# Patient Record
Sex: FEMALE | Race: ASIAN | NOT HISPANIC OR LATINO | Employment: STUDENT | URBAN - METROPOLITAN AREA
[De-identification: names, ages, dates, MRNs, and addresses within clinical notes are randomized per-mention and may not be internally consistent; named-entity substitution may affect disease eponyms.]

---
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
## 2024-01-23 DIAGNOSIS — N92.6 IRREGULAR PERIODS: ICD-10-CM

## 2024-01-24 RX ORDER — NORETHINDRONE ACETATE AND ETHINYL ESTRADIOL 5-7-9-7
1 KIT ORAL DAILY
Qty: 84 TABLET | Refills: 0 | Status: SHIPPED | OUTPATIENT
Start: 2024-01-24 | End: 2024-04-25 | Stop reason: ALTCHOICE

## 2024-01-24 NOTE — TELEPHONE ENCOUNTER
Pt is overdue for an appt.     My chart message sent to the pt.     I did send in 1 refill.    Yahaira BYERS RN

## 2024-02-10 ENCOUNTER — HEALTH MAINTENANCE LETTER (OUTPATIENT)
Age: 19
End: 2024-02-10

## 2024-04-13 DIAGNOSIS — E11.9 TYPE 2 DIABETES MELLITUS WITHOUT COMPLICATION, WITHOUT LONG-TERM CURRENT USE OF INSULIN (H): ICD-10-CM

## 2024-04-15 RX ORDER — METFORMIN HCL 500 MG
TABLET, EXTENDED RELEASE 24 HR ORAL
Qty: 90 TABLET | Refills: 0 | Status: SHIPPED | OUTPATIENT
Start: 2024-04-15 | End: 2024-04-25

## 2024-04-25 ENCOUNTER — OFFICE VISIT (OUTPATIENT)
Dept: FAMILY MEDICINE | Facility: CLINIC | Age: 19
End: 2024-04-25
Attending: PHYSICIAN ASSISTANT
Payer: COMMERCIAL

## 2024-04-25 VITALS
DIASTOLIC BLOOD PRESSURE: 83 MMHG | RESPIRATION RATE: 14 BRPM | BODY MASS INDEX: 34.37 KG/M2 | TEMPERATURE: 98.4 F | HEART RATE: 82 BPM | OXYGEN SATURATION: 98 % | HEIGHT: 67 IN | WEIGHT: 219 LBS | SYSTOLIC BLOOD PRESSURE: 125 MMHG

## 2024-04-25 DIAGNOSIS — R74.8 ELEVATED LIVER ENZYMES: ICD-10-CM

## 2024-04-25 DIAGNOSIS — K76.0 FATTY LIVER DISEASE, NONALCOHOLIC: ICD-10-CM

## 2024-04-25 DIAGNOSIS — F33.1 MAJOR DEPRESSIVE DISORDER, RECURRENT, MODERATE (H): ICD-10-CM

## 2024-04-25 DIAGNOSIS — E28.2 PCOS (POLYCYSTIC OVARIAN SYNDROME): Primary | ICD-10-CM

## 2024-04-25 DIAGNOSIS — N91.2 AMENORRHEA: ICD-10-CM

## 2024-04-25 DIAGNOSIS — E11.9 TYPE 2 DIABETES MELLITUS WITHOUT COMPLICATION, WITHOUT LONG-TERM CURRENT USE OF INSULIN (H): ICD-10-CM

## 2024-04-25 LAB
ERYTHROCYTE [DISTWIDTH] IN BLOOD BY AUTOMATED COUNT: 12.1 % (ref 10–15)
HBA1C MFR BLD: 6 % (ref 0–5.6)
HCT VFR BLD AUTO: 39.6 % (ref 35–47)
HGB BLD-MCNC: 13.4 G/DL (ref 11.7–15.7)
HOLD SPECIMEN: NORMAL
HOLD SPECIMEN: NORMAL
MCH RBC QN AUTO: 30 PG (ref 26.5–33)
MCHC RBC AUTO-ENTMCNC: 33.8 G/DL (ref 31.5–36.5)
MCV RBC AUTO: 89 FL (ref 78–100)
PLATELET # BLD AUTO: 243 10E3/UL (ref 150–450)
RBC # BLD AUTO: 4.47 10E6/UL (ref 3.8–5.2)
WBC # BLD AUTO: 10.1 10E3/UL (ref 4–11)

## 2024-04-25 PROCEDURE — 80053 COMPREHEN METABOLIC PANEL: CPT | Performed by: FAMILY MEDICINE

## 2024-04-25 PROCEDURE — 36415 COLL VENOUS BLD VENIPUNCTURE: CPT | Performed by: FAMILY MEDICINE

## 2024-04-25 PROCEDURE — 85027 COMPLETE CBC AUTOMATED: CPT | Performed by: FAMILY MEDICINE

## 2024-04-25 PROCEDURE — 80061 LIPID PANEL: CPT | Performed by: FAMILY MEDICINE

## 2024-04-25 PROCEDURE — 83036 HEMOGLOBIN GLYCOSYLATED A1C: CPT | Performed by: FAMILY MEDICINE

## 2024-04-25 PROCEDURE — 84443 ASSAY THYROID STIM HORMONE: CPT | Performed by: FAMILY MEDICINE

## 2024-04-25 PROCEDURE — 99214 OFFICE O/P EST MOD 30 MIN: CPT | Performed by: FAMILY MEDICINE

## 2024-04-25 RX ORDER — METFORMIN HCL 500 MG
1000 TABLET, EXTENDED RELEASE 24 HR ORAL
Qty: 180 TABLET | Refills: 1 | Status: SHIPPED | OUTPATIENT
Start: 2024-04-25 | End: 2024-05-12

## 2024-04-25 RX ORDER — DESOGESTREL AND ETHINYL ESTRADIOL 0.15-0.03
1 KIT ORAL DAILY
Qty: 84 TABLET | Refills: 3 | Status: SHIPPED | OUTPATIENT
Start: 2024-04-25

## 2024-04-25 ASSESSMENT — PAIN SCALES - GENERAL: PAINLEVEL: NO PAIN (0)

## 2024-04-25 NOTE — PROGRESS NOTES
"  Assessment & Plan     PCOS (polycystic ovarian syndrome)  Suspect that PCOS is the cause of her amenorrhea.  Will change her from a triphasic OCP to a monophasic OCP and monitor response.    Amenorrhea  Patient says she is not sexually active.  Will check labs, and change OCP.  Follow-up in 3 months or sooner as needed.  - TSH with free T4 reflex; Future  - TSH with free T4 reflex  - desogestrel-ethinyl estradiol (APRI) 0.15-30 MG-MCG tablet; Take 1 tablet by mouth daily  - CBC with platelets; Future  - CBC with platelets    Elevated liver enzymes  Recheck labs, recommendations pending results.    Fatty liver disease, nonalcoholic  Due for labs, recommendations pending results.  - Lipid panel reflex to direct LDL Fasting; Future  - Lipid panel reflex to direct LDL Fasting    Major depressive disorder, recurrent, moderate (H)  Stable, continue to monitor.    Type 2 diabetes mellitus without complication, without long-term current use of insulin (H)  Due for labs, continue metformin, recommendations pending results.  - Hemoglobin A1c; Future  - Hemoglobin A1c  - metFORMIN (GLUCOPHAGE XR) 500 MG 24 hr tablet; Take 2 tablets (1,000 mg) by mouth daily (with dinner)  - Comprehensive metabolic panel (BMP + Alb, Alk Phos, ALT, AST, Total. Bili, TP); Future  - CBC with platelets; Future  - Comprehensive metabolic panel (BMP + Alb, Alk Phos, ALT, AST, Total. Bili, TP)  - CBC with platelets      15 minutes spent by me on the date of the encounter doing chart review, history and exam, documentation and further activities per the note      BMI  Estimated body mass index is 34.3 kg/m  as calculated from the following:    Height as of this encounter: 1.702 m (5' 7\").    Weight as of this encounter: 99.3 kg (219 lb).   Weight management plan: Discussed healthy diet and exercise guidelines      See Patient Instructions    Jessica Xiong is a 19 year old, presenting for the following health issues:  Diabetes (Follow up)        " 4/25/2024     3:52 PM   Additional Questions   Roomed by Lida Sosa   Accompanied by mother     History of Present Illness       Diabetes:   She presents for follow up of diabetes.    She is not checking blood glucose.        She is concerned about other.    She is not experiencing numbness or burning in feet, excessive thirst, blurry vision, weight changes or redness, sores or blisters on feet. The patient has not had a diabetic eye exam in the last 12 months.          Would like to discuss birth control medication    Medication Followup of  TILIA FE 1-20/1-30/1-35 MG  Taking Medication as prescribed: yes  Side Effects:  None  Medication Helping Symptoms: No    Has not had her period since January. Taking the placebo pills.  It did work for a while, she took a trip and changed what time of day she took the pills. Not sure if this is the cause, no changes.    Medication Followup of Metformin   Taking Medication as prescribed: yes  Side Effects:  None  Medication Helping Symptoms:  yes      Current Outpatient Medications   Medication Sig Dispense Refill    blood glucose (NO BRAND SPECIFIED) test strip Use to test blood sugar 1 times daily or as directed. To accompany: Blood Glucose Monitor Brands: per insurance. 100 strip 6    blood glucose monitoring (NO BRAND SPECIFIED) meter device kit Use to test blood sugar 1 times daily or as directed. Preferred blood glucose meter OR supplies to accompany: Blood Glucose Monitor Brands: per insurance. 1 kit 0    desogestrel-ethinyl estradiol (APRI) 0.15-30 MG-MCG tablet Take 1 tablet by mouth daily 84 tablet 3    loratadine (CLARITIN) 10 MG tablet Take 1 tablet (10 mg) by mouth daily 30 tablet 1    metFORMIN (GLUCOPHAGE XR) 500 MG 24 hr tablet Take 2 tablets (1,000 mg) by mouth daily (with dinner) 180 tablet 1    thin (NO BRAND SPECIFIED) lancets Use with lanceting device. To accompany: Blood Glucose Monitor Brands: per insurance. 100 each 6    vitamin D3 (CHOLECALCIFEROL)  "50 mcg (2000 units) tablet Take 1 tablet by mouth daily             Objective    /83 (BP Location: Right arm, Patient Position: Sitting, Cuff Size: Adult Regular)   Pulse 82   Temp 98.4  F (36.9  C) (Oral)   Resp 14   Ht 1.702 m (5' 7\")   Wt 99.3 kg (219 lb)   SpO2 98%   Breastfeeding No   BMI 34.30 kg/m    Body mass index is 34.3 kg/m .  Physical Exam   GENERAL: alert and no distress  RESP: lungs clear to auscultation - no rales, rhonchi or wheezes  CV: regular rates and rhythm  PSYCH: mentation appears normal, affect normal/bright    Labs reviewed in chart        Signed Electronically by: Zoey Brewster MD    "

## 2024-04-26 LAB
ALBUMIN SERPL BCG-MCNC: 4.6 G/DL (ref 3.5–5.2)
ALP SERPL-CCNC: 45 U/L (ref 40–150)
ALT SERPL W P-5'-P-CCNC: 169 U/L (ref 0–50)
ANION GAP SERPL CALCULATED.3IONS-SCNC: 14 MMOL/L (ref 7–15)
AST SERPL W P-5'-P-CCNC: 145 U/L (ref 0–45)
BILIRUB SERPL-MCNC: 0.9 MG/DL
BUN SERPL-MCNC: 9.4 MG/DL (ref 6–20)
CALCIUM SERPL-MCNC: 10 MG/DL (ref 8.6–10)
CHLORIDE SERPL-SCNC: 102 MMOL/L (ref 98–107)
CHOLEST SERPL-MCNC: 163 MG/DL
CREAT SERPL-MCNC: 0.58 MG/DL (ref 0.51–0.95)
DEPRECATED HCO3 PLAS-SCNC: 24 MMOL/L (ref 22–29)
EGFRCR SERPLBLD CKD-EPI 2021: >90 ML/MIN/1.73M2
FASTING STATUS PATIENT QL REPORTED: ABNORMAL
GLUCOSE SERPL-MCNC: 81 MG/DL (ref 70–99)
HDLC SERPL-MCNC: 48 MG/DL
LDLC SERPL CALC-MCNC: 86 MG/DL
NONHDLC SERPL-MCNC: 115 MG/DL
POTASSIUM SERPL-SCNC: 3.9 MMOL/L (ref 3.4–5.3)
PROT SERPL-MCNC: 7.9 G/DL (ref 6.4–8.3)
SODIUM SERPL-SCNC: 140 MMOL/L (ref 135–145)
TRIGL SERPL-MCNC: 143 MG/DL
TSH SERPL DL<=0.005 MIU/L-ACNC: 1.17 UIU/ML (ref 0.3–4.2)

## 2024-05-12 ENCOUNTER — MYC REFILL (OUTPATIENT)
Dept: FAMILY MEDICINE | Facility: CLINIC | Age: 19
End: 2024-05-12
Payer: COMMERCIAL

## 2024-05-12 DIAGNOSIS — K76.0 FATTY LIVER DISEASE, NONALCOHOLIC: ICD-10-CM

## 2024-05-12 DIAGNOSIS — R74.8 ELEVATED LIVER ENZYMES: Primary | ICD-10-CM

## 2024-05-12 DIAGNOSIS — E11.9 TYPE 2 DIABETES MELLITUS WITHOUT COMPLICATION, WITHOUT LONG-TERM CURRENT USE OF INSULIN (H): ICD-10-CM

## 2024-05-13 RX ORDER — METFORMIN HCL 500 MG
1000 TABLET, EXTENDED RELEASE 24 HR ORAL 2 TIMES DAILY WITH MEALS
Qty: 360 TABLET | Refills: 1 | Status: SHIPPED | OUTPATIENT
Start: 2024-05-13

## 2024-06-24 ENCOUNTER — PATIENT OUTREACH (OUTPATIENT)
Dept: CARE COORDINATION | Facility: CLINIC | Age: 19
End: 2024-06-24
Payer: COMMERCIAL

## 2024-06-26 ENCOUNTER — TELEPHONE (OUTPATIENT)
Dept: FAMILY MEDICINE | Facility: CLINIC | Age: 19
End: 2024-06-26
Payer: COMMERCIAL

## 2024-06-26 NOTE — TELEPHONE ENCOUNTER
Patient Quality Outreach    Patient is due for the following:   Diabetes -  Eye Exam  Depression  -  PHQ-9 needed and DAP  Physical Well Child Check      Topic Date Due    COVID-19 Vaccine (4 - 2023-24 season) 09/01/2023    Pneumococcal Vaccine (2 of 2 - PCV) 12/27/2023       Next Steps:   Schedule a Well Child Check    Type of outreach:    Sent [employer] message.    Next Steps:  Reach out within 90 days via Phone.    Max number of attempts reached: No. Will try again in 90 days if patient still on fail list.    Questions for provider review:    None           Francheska Munroe MA

## 2024-09-07 ENCOUNTER — HEALTH MAINTENANCE LETTER (OUTPATIENT)
Age: 19
End: 2024-09-07

## 2024-10-27 ENCOUNTER — MYC MEDICAL ADVICE (OUTPATIENT)
Dept: FAMILY MEDICINE | Facility: CLINIC | Age: 19
End: 2024-10-27
Payer: COMMERCIAL

## 2024-10-28 NOTE — TELEPHONE ENCOUNTER
See [employer] message.     Replied via [employer].     Jazz ARIAS RN   Clinic RN  [facility]

## 2024-10-31 ENCOUNTER — OFFICE VISIT (OUTPATIENT)
Dept: OPTOMETRY | Facility: CLINIC | Age: 19
End: 2024-10-31
Payer: COMMERCIAL

## 2024-10-31 DIAGNOSIS — H52.13 MYOPIA OF BOTH EYES: ICD-10-CM

## 2024-10-31 DIAGNOSIS — E11.9 TYPE 2 DIABETES MELLITUS WITHOUT RETINOPATHY (H): Primary | ICD-10-CM

## 2024-10-31 PROCEDURE — 92004 COMPRE OPH EXAM NEW PT 1/>: CPT | Performed by: OPTOMETRIST

## 2024-10-31 PROCEDURE — 92015 DETERMINE REFRACTIVE STATE: CPT | Performed by: OPTOMETRIST

## 2024-10-31 ASSESSMENT — CONF VISUAL FIELD
OD_INFERIOR_TEMPORAL_RESTRICTION: 0
METHOD: COUNTING FINGERS
OS_INFERIOR_NASAL_RESTRICTION: 0
OS_SUPERIOR_NASAL_RESTRICTION: 0
OD_INFERIOR_NASAL_RESTRICTION: 0
OS_NORMAL: 1
OD_SUPERIOR_NASAL_RESTRICTION: 0
OS_SUPERIOR_TEMPORAL_RESTRICTION: 0
OS_INFERIOR_TEMPORAL_RESTRICTION: 0
OD_NORMAL: 1
OD_SUPERIOR_TEMPORAL_RESTRICTION: 0

## 2024-10-31 ASSESSMENT — REFRACTION_WEARINGRX
OD_SPHERE: -1.50
OD_CYLINDER: SPHERE
SPECS_TYPE: SVL
OS_AXIS: 151
OS_SPHERE: -1.25
OS_CYLINDER: +0.25

## 2024-10-31 ASSESSMENT — REFRACTION_MANIFEST
OS_CYLINDER: +0.25
OS_AXIS: 153
OD_SPHERE: -2.00
OD_CYLINDER: +0.25
OS_CYLINDER: SPHERE
OD_CYLINDER: SPHERE
OS_SPHERE: -1.75
METHOD_AUTOREFRACTION: 1
OD_AXIS: 020
OS_SPHERE: -2.50
OD_SPHERE: -2.25

## 2024-10-31 ASSESSMENT — VISUAL ACUITY
OD_CC: 20/20
METHOD: SNELLEN - LINEAR
OS_CC: 20/25
OD_CC: 20/20
CORRECTION_TYPE: GLASSES
OS_CC: 20/20-2

## 2024-10-31 ASSESSMENT — TONOMETRY
OD_IOP_MMHG: 17
IOP_METHOD: APPLANATION
OS_IOP_MMHG: 17

## 2024-10-31 ASSESSMENT — SLIT LAMP EXAM - LIDS
COMMENTS: NORMAL
COMMENTS: NORMAL

## 2024-10-31 ASSESSMENT — EXTERNAL EXAM - LEFT EYE: OS_EXAM: NORMAL

## 2024-10-31 ASSESSMENT — CUP TO DISC RATIO
OD_RATIO: 0.4
OS_RATIO: 0.3

## 2024-10-31 ASSESSMENT — EXTERNAL EXAM - RIGHT EYE: OD_EXAM: NORMAL

## 2024-10-31 NOTE — PATIENT INSTRUCTIONS
Patient Education   Diabetes weakens the blood vessels all over the body, including the eyes. Damage to the blood vessels in the eyes can cause swelling or bleeding into part of the eye (called the retina). This is called diabetic retinopathy (TAMARA-tin-AH-pu-thee). If not treated, this disease can cause vision loss or blindness.   Symptoms may include blurred or distorted vision, but many people have no symptoms. It's important to see your eye doctor regularly to check for problems.   Early treatment and good control can help protect your vision. Here are the things you can do to help prevent vision loss:      1. Keep your blood sugar levels under tight control.      2. Bring high blood pressure under control.      3. No smoking.      4. Have yearly dilated eye exams.    No retinopathy!   Very mild change in prescription    present

## 2024-10-31 NOTE — LETTER
10/31/2024      Inga Covarrubias  [address] [facility] [address]      Dear Colleague,    Thank you for referring your patient, Inga Covarrubias, to the [facility]. Please see a copy of my visit note below.    Chief Complaint   Patient presents with     Diabetic Eye Exam       Lab Results   Component Value Date    A1C 6.0 04/25/2024    A1C 5.7 10/20/2023    A1C 5.7 04/04/2023    A1C 5.9 12/27/2022    A1C 6.7 08/02/2022            Last Eye Exam: 4-5 years ago   Dilated Previously: Yes, side effects of dilation explained today    What are you currently using to see?  glasses    Distance Vision Acuity: Noticed gradual change in both eyes in glasses     Near Vision Acuity: Satisfied with vision while reading and using computer with glasses    Eye Comfort: good  Do you use eye drops? : No      Kate Dick - Optometric Assistant      Medical, surgical and family histories reviewed and updated 10/31/2024.       OBJECTIVE: See Ophthalmology exam    ASSESSMENT:    ICD-10-CM    1. Type 2 diabetes mellitus without retinopathy (H)  E11.9 EYE EXAM (SIMPLE-NONBILLABLE)      2. Myopia of both eyes  H52.13 EYE EXAM (SIMPLE-NONBILLABLE)     REFRACTION          PLAN:  Discussed ongoing glucose control to prevent vision loss  Inga CUELLAR Marci aware  eye exam results will be sent to Zoey Casas.    Opal Lambert OD          Again, thank you for allowing me to participate in the care of your patient.        Sincerely,        Opal Lambert, OD

## 2024-10-31 NOTE — PROGRESS NOTES
Chief Complaint   Patient presents with    Diabetic Eye Exam       Lab Results   Component Value Date    A1C 6.0 04/25/2024    A1C 5.7 10/20/2023    A1C 5.7 04/04/2023    A1C 5.9 12/27/2022    A1C 6.7 08/02/2022            Last Eye Exam: 4-5 years ago   Dilated Previously: Yes, side effects of dilation explained today    What are you currently using to see?  glasses    Distance Vision Acuity: Noticed gradual change in both eyes in glasses     Near Vision Acuity: Satisfied with vision while reading and using computer with glasses    Eye Comfort: good  Do you use eye drops? : No      Kate Dick - Optometric Assistant      Medical, surgical and family histories reviewed and updated 10/31/2024.       OBJECTIVE: See Ophthalmology exam    ASSESSMENT:    ICD-10-CM    1. Type 2 diabetes mellitus without retinopathy (H)  E11.9 EYE EXAM (SIMPLE-NONBILLABLE)      2. Myopia of both eyes  H52.13 EYE EXAM (SIMPLE-NONBILLABLE)     REFRACTION          PLAN:  Discussed ongoing glucose control to prevent vision loss  Inga Covarrubias aware  eye exam results will be sent to Zoey Casas.    Opal Lambert OD

## 2024-11-05 ENCOUNTER — LAB (OUTPATIENT)
Dept: LAB | Facility: CLINIC | Age: 19
End: 2024-11-05
Payer: COMMERCIAL

## 2024-11-05 DIAGNOSIS — Z11.3 SCREENING FOR STDS (SEXUALLY TRANSMITTED DISEASES): ICD-10-CM

## 2024-11-05 DIAGNOSIS — E11.9 TYPE 2 DIABETES MELLITUS WITHOUT COMPLICATION, WITHOUT LONG-TERM CURRENT USE OF INSULIN (H): ICD-10-CM

## 2024-11-05 LAB
CREAT UR-MCNC: 155 MG/DL
EST. AVERAGE GLUCOSE BLD GHB EST-MCNC: 114 MG/DL
HBA1C MFR BLD: 5.6 % (ref 0–5.6)
MICROALBUMIN UR-MCNC: 42.2 MG/L
MICROALBUMIN/CREAT UR: 27.23 MG/G CR (ref 0–25)

## 2024-11-05 PROCEDURE — 83036 HEMOGLOBIN GLYCOSYLATED A1C: CPT

## 2024-11-05 PROCEDURE — 82570 ASSAY OF URINE CREATININE: CPT

## 2024-11-05 PROCEDURE — 82043 UR ALBUMIN QUANTITATIVE: CPT

## 2024-11-05 PROCEDURE — 36415 COLL VENOUS BLD VENIPUNCTURE: CPT

## 2024-12-25 DIAGNOSIS — E11.9 TYPE 2 DIABETES MELLITUS WITHOUT COMPLICATION, WITHOUT LONG-TERM CURRENT USE OF INSULIN (H): ICD-10-CM

## 2024-12-26 RX ORDER — METFORMIN HYDROCHLORIDE 500 MG/1
1000 TABLET, EXTENDED RELEASE ORAL 2 TIMES DAILY WITH MEALS
Qty: 360 TABLET | Refills: 0 | Status: SHIPPED | OUTPATIENT
Start: 2024-12-26

## 2025-01-02 NOTE — PATIENT INSTRUCTIONS
Patient Education    [facility]- PARENT  15 THROUGH 17 YEAR VISITS  Here are some suggestions from [employer] experts that may be of value to your family.     HOW YOUR FAMILY IS DOING  Set aside time to be with your teen and really listen to her hopes and concerns.  Support your teen in finding activities that interest him. Encourage your teen to help others in the community.  Help your teen find and be a part of positive after-school activities and sports.  Support your teen as she figures out ways to deal with stress, solve problems, and make decisions.  Help your teen deal with conflict.  If you are worried about your living or food situation, talk with us. Community agencies and programs such as SNAP can also provide information.    YOUR GROWING AND CHANGING TEEN  Make sure your teen visits the dentist at least twice a year.  Give your teen a fluoride supplement if the dentist recommends it.  Support your teen s healthy body weight and help him be a healthy eater.  Provide healthy foods.  Eat together as a family.  Be a role model.  Help your teen get enough calcium with low-fat or fat-free milk, low-fat yogurt, and cheese.  Encourage at least 1 hour of physical activity a day.  Praise your teen when she does something well, not just when she looks good.    YOUR TEEN S FEELINGS  If you are concerned that your teen is sad, depressed, nervous, irritable, hopeless, or angry, let us know.  If you have questions about your teen s sexual development, you can always talk with us.    HEALTHY BEHAVIOR CHOICES  Know your teen s friends and their parents. Be aware of where your teen is and what he is doing at all times.  Talk with your teen about your values and your expectations on drinking, drug use, tobacco use, driving, and sex.  Praise your teen for healthy decisions about sex, tobacco, alcohol, and other drugs.  Be a role model.  Know your teen s friends and their activities together.  Lock your  liquor in a cabinet.  Store prescription medications in a locked cabinet.  Be there for your teen when she needs support or help in making healthy decisions about her behavior.    SAFETY  Encourage safe and responsible driving habits.  Lap and shoulder seat belts should be used by everyone.  Limit the number of friends in the car and ask your teen to avoid driving at night.  Discuss with your teen how to avoid risky situations, who to call if your teen feels unsafe, and what you expect of your teen as a .  Do not tolerate drinking and driving.  If it is necessary to keep a gun in your home, store it unloaded and locked with the ammunition locked separately from the gun.      Consistent with Bright Futures: Guidelines for Health Supervision of Infants, Children, and Adolescents, 4th Edition  For more information, go to https://brightfutures.aap.org.            [General Appearance - Alert] : alert [General Appearance - In No Acute Distress] : in no acute distress [General Appearance - Well Nourished] : well nourished [Sclera] : the sclera and conjunctiva were normal [Extraocular Movements] : extraocular movements were intact [Outer Ear] : the ears and nose were normal in appearance [Examination Of The Oral Cavity] : the lips and gums were normal [Neck Appearance] : the appearance of the neck was normal [Respiration, Rhythm And Depth] : normal respiratory rhythm and effort [Exaggerated Use Of Accessory Muscles For Inspiration] : no accessory muscle use [Auscultation Breath Sounds / Voice Sounds] : lungs were clear to auscultation bilaterally [Edema] : there was no peripheral edema [Bowel Sounds] : normal bowel sounds [Abdomen Soft] : soft [FreeTextEntry1] : + Sky catheter  [Musculoskeletal - Swelling] : no joint swelling [Range of Motion to Joints] : range of motion to joints [Skin Color & Pigmentation] : normal skin color and pigmentation [] : no rash [Sensation] : the sensory exam was normal to light touch and pinprick [Motor Exam] : the motor exam was normal [Oriented To Time, Place, And Person] : oriented to person, place, and time [Affect] : the affect was normal

## 2025-01-13 ENCOUNTER — MYC MEDICAL ADVICE (OUTPATIENT)
Dept: FAMILY MEDICINE | Facility: CLINIC | Age: 20
End: 2025-01-13
Payer: COMMERCIAL

## 2025-01-21 NOTE — TELEPHONE ENCOUNTER
Patient Quality Outreach    Patient is due for the following:   Diabetes -  Foot Exam  Depression  -  PHQ-9 needed  Physical Preventive Adult Physical    Action(s) Taken:   Schedule a Adult Preventative    Type of outreach:    Sent [employer] message.    Questions for provider review:    None           Aidee Ca, CMA

## 2025-03-02 ENCOUNTER — HEALTH MAINTENANCE LETTER (OUTPATIENT)
Age: 20
End: 2025-03-02

## 2025-03-24 DIAGNOSIS — N91.2 AMENORRHEA: ICD-10-CM

## 2025-03-24 RX ORDER — DESOGESTREL AND ETHINYL ESTRADIOL 0.15-0.03
1 KIT ORAL DAILY
Qty: 84 TABLET | Refills: 0 | Status: SHIPPED | OUTPATIENT
Start: 2025-03-24

## 2025-03-26 ENCOUNTER — TELEPHONE (OUTPATIENT)
Dept: FAMILY MEDICINE | Facility: CLINIC | Age: 20
End: 2025-03-26
Payer: COMMERCIAL

## 2025-03-26 NOTE — TELEPHONE ENCOUNTER
Pt calling to schedule a mantoux read and resulting as she received a mantoux skin test through [facility] clinic, however was unable to have it read in time.     Assisted pt in scheduling.   3/31 at 3 PM for placement  4/3 at 8 AM for mantoux read.     Pt verbalizes understanding and is agreeable with plan. Pt denies any further questions or concerns at this time.     Jazz ARIAS RN   Clinic RN  [facility]

## 2025-03-31 ENCOUNTER — ALLIED HEALTH/NURSE VISIT (OUTPATIENT)
Dept: FAMILY MEDICINE | Facility: CLINIC | Age: 20
End: 2025-03-31
Payer: COMMERCIAL

## 2025-03-31 DIAGNOSIS — Z11.1 VISIT FOR MANTOUX TEST: Primary | ICD-10-CM

## 2025-03-31 PROCEDURE — 99207 PR NO CHARGE NURSE ONLY: CPT

## 2025-03-31 PROCEDURE — 86580 TB INTRADERMAL TEST: CPT

## 2025-04-03 ENCOUNTER — ALLIED HEALTH/NURSE VISIT (OUTPATIENT)
Dept: FAMILY MEDICINE | Facility: CLINIC | Age: 20
End: 2025-04-03
Payer: COMMERCIAL

## 2025-04-03 DIAGNOSIS — Z11.1 SCREENING EXAMINATION FOR PULMONARY TUBERCULOSIS: Primary | ICD-10-CM

## 2025-04-03 LAB
PPDINDURATION: 0 MM (ref 0–4.99)
PPDREDNESS: NORMAL

## 2025-04-03 NOTE — LETTER
April 3, 2025      Inga Covarrubias  [address] [facility] [address]        To Whom It May Concern:     Patient had mantoux placed on 3/31/2025 @ 3:20 left forearm     Results read on 4/3/2025 @ 7:50 AM     Results is NEGATIVE         Sincerely,    Jazz Wilde RN on behalf of Dr. Swapna Brewster         [facility]

## 2025-04-03 NOTE — PROGRESS NOTES
"Patient is here today for a Mantoux (TST) test results.    Did patient return to clinic 48-72 hours from Mantoux (TST) placement: Yes -     PPD Induration   Date Value Ref Range Status   04/03/2025 0 0 - 4.99 mm Final     No results found for: \"PPDREDNESS\"        Induration Size? Induration <5mm - Enter results in Enter/Edit Activity. Route results to ordering provider.     Patient needs form signed? No    Patient reports having previously had the BCG Vaccine: No    Does patient need a two step? No    Jazz ARIAS RN   Clinic RN  [facility]               "

## 2025-05-03 DIAGNOSIS — E11.9 TYPE 2 DIABETES MELLITUS WITHOUT COMPLICATION, WITHOUT LONG-TERM CURRENT USE OF INSULIN (H): ICD-10-CM

## 2025-05-05 RX ORDER — METFORMIN HYDROCHLORIDE 500 MG/1
1000 TABLET, EXTENDED RELEASE ORAL 2 TIMES DAILY WITH MEALS
Qty: 360 TABLET | Refills: 1 | Status: SHIPPED | OUTPATIENT
Start: 2025-05-05

## 2025-06-12 DIAGNOSIS — N91.2 AMENORRHEA: ICD-10-CM

## 2025-06-12 RX ORDER — DESOGESTREL AND ETHINYL ESTRADIOL 0.15-0.03
1 KIT ORAL DAILY
Qty: 84 TABLET | Refills: 0 | Status: SHIPPED | OUTPATIENT
Start: 2025-06-12

## 2025-06-21 ENCOUNTER — HEALTH MAINTENANCE LETTER (OUTPATIENT)
Age: 20
End: 2025-06-21

## 2025-08-10 SDOH — HEALTH STABILITY: PHYSICAL HEALTH: ON AVERAGE, HOW MANY DAYS PER WEEK DO YOU ENGAGE IN MODERATE TO STRENUOUS EXERCISE (LIKE A BRISK WALK)?: 4 DAYS

## 2025-08-10 SDOH — HEALTH STABILITY: PHYSICAL HEALTH: ON AVERAGE, HOW MANY MINUTES DO YOU ENGAGE IN EXERCISE AT THIS LEVEL?: 60 MIN

## 2025-08-10 ASSESSMENT — SOCIAL DETERMINANTS OF HEALTH (SDOH): HOW OFTEN DO YOU GET TOGETHER WITH FRIENDS OR RELATIVES?: ONCE A WEEK

## 2025-08-15 ENCOUNTER — OFFICE VISIT (OUTPATIENT)
Dept: FAMILY MEDICINE | Facility: CLINIC | Age: 20
End: 2025-08-15
Payer: COMMERCIAL

## 2025-08-15 VITALS
HEART RATE: 76 BPM | OXYGEN SATURATION: 98 % | RESPIRATION RATE: 14 BRPM | WEIGHT: 215 LBS | BODY MASS INDEX: 33.74 KG/M2 | TEMPERATURE: 98.3 F | HEIGHT: 67 IN | SYSTOLIC BLOOD PRESSURE: 139 MMHG | DIASTOLIC BLOOD PRESSURE: 87 MMHG

## 2025-08-15 DIAGNOSIS — E11.9 TYPE 2 DIABETES MELLITUS WITHOUT COMPLICATION, WITHOUT LONG-TERM CURRENT USE OF INSULIN (H): ICD-10-CM

## 2025-08-15 DIAGNOSIS — N91.2 AMENORRHEA: ICD-10-CM

## 2025-08-15 DIAGNOSIS — K76.0 METABOLIC DYSFUNCTION-ASSOCIATED STEATOTIC LIVER DISEASE (MASLD): ICD-10-CM

## 2025-08-15 DIAGNOSIS — R74.8 ELEVATED LIVER ENZYMES: ICD-10-CM

## 2025-08-15 DIAGNOSIS — Z00.00 ROUTINE GENERAL MEDICAL EXAMINATION AT A HEALTH CARE FACILITY: Primary | ICD-10-CM

## 2025-08-15 DIAGNOSIS — Z53.20 CERVICAL CANCER SCREENING DECLINED: ICD-10-CM

## 2025-08-15 LAB
ALBUMIN SERPL BCG-MCNC: 4.4 G/DL (ref 3.5–5.2)
ALP SERPL-CCNC: 37 U/L (ref 40–150)
ALT SERPL W P-5'-P-CCNC: 222 U/L (ref 0–50)
ANION GAP SERPL CALCULATED.3IONS-SCNC: 12 MMOL/L (ref 7–15)
AST SERPL W P-5'-P-CCNC: 127 U/L (ref 0–45)
BILIRUB SERPL-MCNC: 0.6 MG/DL
BUN SERPL-MCNC: 9.6 MG/DL (ref 6–20)
CALCIUM SERPL-MCNC: 10 MG/DL (ref 8.8–10.4)
CHLORIDE SERPL-SCNC: 102 MMOL/L (ref 98–107)
CHOLEST SERPL-MCNC: 160 MG/DL
CREAT SERPL-MCNC: 0.52 MG/DL (ref 0.51–0.95)
EGFRCR SERPLBLD CKD-EPI 2021: >90 ML/MIN/1.73M2
EST. AVERAGE GLUCOSE BLD GHB EST-MCNC: 114 MG/DL
FASTING STATUS PATIENT QL REPORTED: NO
FASTING STATUS PATIENT QL REPORTED: NO
FERRITIN SERPL-MCNC: 424 NG/ML (ref 6–175)
GLUCOSE SERPL-MCNC: 82 MG/DL (ref 70–99)
HBA1C MFR BLD: 5.6 % (ref 0–5.6)
HBV CORE AB SERPL QL IA: NONREACTIVE
HBV SURFACE AB SERPL IA-ACNC: <3.5 M[IU]/ML
HBV SURFACE AB SERPL IA-ACNC: NONREACTIVE M[IU]/ML
HBV SURFACE AG SERPL QL IA: NONREACTIVE
HCO3 SERPL-SCNC: 25 MMOL/L (ref 22–29)
HCV AB SERPL QL IA: NONREACTIVE
HDLC SERPL-MCNC: 43 MG/DL
IRON BINDING CAPACITY (ROCHE): 371 UG/DL (ref 240–430)
IRON SATN MFR SERPL: 22 % (ref 15–46)
IRON SERPL-MCNC: 81 UG/DL (ref 37–145)
LDLC SERPL CALC-MCNC: 88 MG/DL
NONHDLC SERPL-MCNC: 117 MG/DL
POTASSIUM SERPL-SCNC: 4 MMOL/L (ref 3.4–5.3)
PROT SERPL-MCNC: 7.8 G/DL (ref 6.4–8.3)
SODIUM SERPL-SCNC: 139 MMOL/L (ref 135–145)
TRIGL SERPL-MCNC: 143 MG/DL

## 2025-08-15 PROCEDURE — 86803 HEPATITIS C AB TEST: CPT | Performed by: FAMILY MEDICINE

## 2025-08-15 PROCEDURE — 99395 PREV VISIT EST AGE 18-39: CPT | Performed by: FAMILY MEDICINE

## 2025-08-15 PROCEDURE — 87340 HEPATITIS B SURFACE AG IA: CPT | Performed by: FAMILY MEDICINE

## 2025-08-15 PROCEDURE — 86706 HEP B SURFACE ANTIBODY: CPT | Performed by: FAMILY MEDICINE

## 2025-08-15 PROCEDURE — 83540 ASSAY OF IRON: CPT | Performed by: FAMILY MEDICINE

## 2025-08-15 PROCEDURE — 80053 COMPREHEN METABOLIC PANEL: CPT | Performed by: FAMILY MEDICINE

## 2025-08-15 PROCEDURE — 86704 HEP B CORE ANTIBODY TOTAL: CPT | Performed by: FAMILY MEDICINE

## 2025-08-15 PROCEDURE — 83550 IRON BINDING TEST: CPT | Performed by: FAMILY MEDICINE

## 2025-08-15 PROCEDURE — 82728 ASSAY OF FERRITIN: CPT | Performed by: FAMILY MEDICINE

## 2025-08-15 PROCEDURE — G2211 COMPLEX E/M VISIT ADD ON: HCPCS | Performed by: FAMILY MEDICINE

## 2025-08-15 PROCEDURE — 80061 LIPID PANEL: CPT | Performed by: FAMILY MEDICINE

## 2025-08-15 PROCEDURE — 36415 COLL VENOUS BLD VENIPUNCTURE: CPT | Performed by: FAMILY MEDICINE

## 2025-08-15 PROCEDURE — 99214 OFFICE O/P EST MOD 30 MIN: CPT | Mod: 25 | Performed by: FAMILY MEDICINE

## 2025-08-15 PROCEDURE — 83036 HEMOGLOBIN GLYCOSYLATED A1C: CPT | Performed by: FAMILY MEDICINE

## 2025-08-15 RX ORDER — DESOGESTREL AND ETHINYL ESTRADIOL 0.15-0.03
1 KIT ORAL DAILY
Qty: 84 TABLET | Refills: 3 | Status: SHIPPED | OUTPATIENT
Start: 2025-08-15

## 2025-08-15 ASSESSMENT — PAIN SCALES - GENERAL: PAINLEVEL_OUTOF10: NO PAIN (0)

## 2025-08-15 ASSESSMENT — PATIENT HEALTH QUESTIONNAIRE - PHQ9
10. IF YOU CHECKED OFF ANY PROBLEMS, HOW DIFFICULT HAVE THESE PROBLEMS MADE IT FOR YOU TO DO YOUR WORK, TAKE CARE OF THINGS AT HOME, OR GET ALONG WITH OTHER PEOPLE: NOT DIFFICULT AT ALL
SUM OF ALL RESPONSES TO PHQ QUESTIONS 1-9: 0
SUM OF ALL RESPONSES TO PHQ QUESTIONS 1-9: 0

## 2025-08-18 ENCOUNTER — DOCUMENTATION ONLY (OUTPATIENT)
Dept: FAMILY MEDICINE | Facility: CLINIC | Age: 20
End: 2025-08-18
Payer: COMMERCIAL

## 2025-08-23 ENCOUNTER — ANCILLARY PROCEDURE (OUTPATIENT)
Dept: ULTRASOUND IMAGING | Facility: CLINIC | Age: 20
End: 2025-08-23
Attending: FAMILY MEDICINE
Payer: COMMERCIAL

## 2025-08-23 DIAGNOSIS — R74.8 ELEVATED LIVER ENZYMES: ICD-10-CM

## 2025-08-23 DIAGNOSIS — K76.0 METABOLIC DYSFUNCTION-ASSOCIATED STEATOTIC LIVER DISEASE (MASLD): ICD-10-CM

## 2025-08-23 PROCEDURE — 76981 USE PARENCHYMA: CPT | Performed by: STUDENT IN AN ORGANIZED HEALTH CARE EDUCATION/TRAINING PROGRAM

## 2025-08-24 PROBLEM — R74.8 ELEVATED LIVER ENZYMES: Status: ACTIVE | Noted: 2021-04-18
